# Patient Record
Sex: MALE | Race: OTHER
[De-identification: names, ages, dates, MRNs, and addresses within clinical notes are randomized per-mention and may not be internally consistent; named-entity substitution may affect disease eponyms.]

---

## 2017-10-10 NOTE — ROOR
________________________________________________________________________________

Patient Name: Wilbert Portillo              Procedure Date: 10/10/2017 11:13 AM

MRN: [id number]                          Account Number: [id number]

YOB: 1965               Age: 52

Room: [facility]                            Gender: Male

Note Status: Finalized                 

________________________________________________________________________________

 

Procedure:           Colonoscopy

Indications:         Screening for colorectal malignant neoplasm

Providers:           Julio ZHAO MD

Referring MD:        Brooklynn Medina NP

Requesting Provider: 

Medicines:           Monitored Anesthesia Care

Complications:       No immediate complications.

________________________________________________________________________________

Procedure:           Pre-Anesthesia Assessment:

                     - The heart rate, respiratory rate, oxygen saturations, 

                     blood pressure, adequacy of pulmonary ventilation, and 

                     response to care were monitored throughout the procedure.

                     The Colonoscope was introduced through the anus and 

                     advanced to the cecum, identified by appendiceal orifice 

                     and ileocecal valve. The colonoscopy was performed 

                     without difficulty. The patient tolerated the procedure 

                     well. The quality of the bowel preparation was good.

                                                                                

Findings:

     The perianal and digital rectal examinations were normal.

     A 3 mm polyp was found in the cecum. The polyp was sessile. The polyp was 

     removed with a jumbo cold forceps. Resection and retrieval were complete.

     Two semi-sessile polyps were found in the rectum. The polyps were 4 mm in 

     size. These polyps were removed with a cold snare. Resection and 

     retrieval were complete.

     Small Internal Hemorrhoids.

     The exam was otherwise without abnormality on direct and retroflexion 

     views.

     (Exam: Complete, Prep: Good or Excellent.)

                                                                                

Impression:          - (Exam: Complete, Prep: Good or Excellent.)

                     - One 3 mm polyp in the cecum, removed with a jumbo cold 

                     forceps. Resected and retrieved.

                     - Two 4 mm polyps in the rectum, removed with a cold 

                     snare. Resected and retrieved.

                     - Small Internal Hemorrhoids.

                     - The examination was otherwise normal on direct and 

                     retroflexion views.

Recommendation:      - Telephone endoscopist for pathology results in 2 weeks.

                     - If the pathology report reveals adenomatous tissue, 

                     then repeat the colonoscopy for surveillance in 3 years.

                     - If the pathology report indicates hyperplastic polyp, 

                     then repeat colonoscopy for screening purposes in 10 

                     years.

                                                                                

 

Julio Zhao MD

________________

Julio ZHAO MD

10/10/2017 11:29:59 AM

This report has been signed electronically.

Number of Addenda: 0

 

Note Initiated On: 10/10/2017 11:13 AM

Estimated Blood Loss:

     Estimated blood loss: none.

## 2019-04-01 NOTE — REP
LEFT SHOULDER, THREE VIEWS:

 

There is no evidence of an acute fracture, dislocation or intrinsic bone

disease.

 

IMPRESSION:

 

No fracture or dislocation.

 

 

Electronically Signed by

Steve العلي MD 04/02/2019 03:23 P

## 2021-01-29 ENCOUNTER — HOSPITAL ENCOUNTER (OUTPATIENT)
Dept: HOSPITAL 53 - M LABSMTC | Age: 56
End: 2021-01-29
Attending: PEDIATRICS
Payer: SELF-PAY

## 2021-01-29 DIAGNOSIS — Z20.822: Primary | ICD-10-CM

## 2021-04-22 ENCOUNTER — HOSPITAL ENCOUNTER (OUTPATIENT)
Dept: HOSPITAL 53 - M LABSMTC | Age: 56
End: 2021-04-22
Attending: PEDIATRICS
Payer: SELF-PAY

## 2021-04-22 DIAGNOSIS — Z20.822: Primary | ICD-10-CM
